# Patient Record
Sex: FEMALE | Race: OTHER
[De-identification: names, ages, dates, MRNs, and addresses within clinical notes are randomized per-mention and may not be internally consistent; named-entity substitution may affect disease eponyms.]

---

## 2020-05-28 ENCOUNTER — HOSPITAL ENCOUNTER (EMERGENCY)
Dept: HOSPITAL 72 - EMR | Age: 30
Discharge: HOME | End: 2020-05-28
Payer: MEDICAID

## 2020-05-28 DIAGNOSIS — R10.30: Primary | ICD-10-CM

## 2020-05-28 DIAGNOSIS — D25.9: ICD-10-CM

## 2020-05-28 DIAGNOSIS — N92.0: ICD-10-CM

## 2020-05-28 LAB
ADD MANUAL DIFF: NO
ALBUMIN SERPL-MCNC: 3.9 G/DL (ref 3.4–5)
ALBUMIN/GLOB SERPL: 1.1 {RATIO} (ref 1–2.7)
ALP SERPL-CCNC: 71 U/L (ref 46–116)
ALT SERPL-CCNC: 19 U/L (ref 12–78)
ANION GAP SERPL CALC-SCNC: 9 MMOL/L (ref 5–15)
APPEARANCE UR: CLEAR
APTT PPP: (no result) S
AST SERPL-CCNC: 17 U/L (ref 15–37)
BASOPHILS NFR BLD AUTO: 1.7 % (ref 0–2)
BILIRUB SERPL-MCNC: 0.2 MG/DL (ref 0.2–1)
BUN SERPL-MCNC: 8 MG/DL (ref 7–18)
CALCIUM SERPL-MCNC: 8.9 MG/DL (ref 8.5–10.1)
CHLORIDE SERPL-SCNC: 106 MMOL/L (ref 98–107)
CO2 SERPL-SCNC: 27 MMOL/L (ref 21–32)
CREAT SERPL-MCNC: 0.7 MG/DL (ref 0.55–1.3)
EOSINOPHIL NFR BLD AUTO: 2 % (ref 0–3)
ERYTHROCYTE [DISTWIDTH] IN BLOOD BY AUTOMATED COUNT: 15.6 % (ref 11.6–14.8)
GLOBULIN SER-MCNC: 3.5 G/DL
GLUCOSE UR STRIP-MCNC: NEGATIVE MG/DL
HCT VFR BLD CALC: 37 % (ref 37–47)
HGB BLD-MCNC: 11.8 G/DL (ref 12–16)
KETONES UR QL STRIP: NEGATIVE
LEUKOCYTE ESTERASE UR QL STRIP: NEGATIVE
LYMPHOCYTES NFR BLD AUTO: 40.1 % (ref 20–45)
MCV RBC AUTO: 86 FL (ref 80–99)
MONOCYTES NFR BLD AUTO: 10.1 % (ref 1–10)
NEUTROPHILS NFR BLD AUTO: 46.2 % (ref 45–75)
NITRITE UR QL STRIP: NEGATIVE
PH UR STRIP: 5 [PH] (ref 4.5–8)
PLATELET # BLD: 248 K/UL (ref 150–450)
POTASSIUM SERPL-SCNC: 3.5 MMOL/L (ref 3.5–5.1)
PROT UR QL STRIP: NEGATIVE
RBC # BLD AUTO: 4.31 M/UL (ref 4.2–5.4)
SODIUM SERPL-SCNC: 142 MMOL/L (ref 136–145)
SP GR UR STRIP: 1.02 (ref 1–1.03)
UROBILINOGEN UR-MCNC: NORMAL MG/DL (ref 0–1)
WBC # BLD AUTO: 5.3 K/UL (ref 4.8–10.8)

## 2020-05-28 PROCEDURE — 99284 EMERGENCY DEPT VISIT MOD MDM: CPT

## 2020-05-28 PROCEDURE — 74176 CT ABD & PELVIS W/O CONTRAST: CPT

## 2020-05-28 PROCEDURE — 85025 COMPLETE CBC W/AUTO DIFF WBC: CPT

## 2020-05-28 PROCEDURE — 80307 DRUG TEST PRSMV CHEM ANLYZR: CPT

## 2020-05-28 PROCEDURE — 80053 COMPREHEN METABOLIC PANEL: CPT

## 2020-05-28 PROCEDURE — 36415 COLL VENOUS BLD VENIPUNCTURE: CPT

## 2020-05-28 PROCEDURE — 81025 URINE PREGNANCY TEST: CPT

## 2020-05-28 PROCEDURE — 81003 URINALYSIS AUTO W/O SCOPE: CPT

## 2020-05-28 NOTE — EMERGENCY ROOM REPORT
History of Present Illness


General


Source:  Patient





Present Illness


HPI


Is a 30-year-old female with no past medical history.  She presents with chief 

plan abdominal pain.  This been a chronic problem for several months but worse 

in the last couple months.  Usually onset around her menstruation.  She said 

her menstruation is very heavy with clots.  She came in because with this 

menstruation she has severe pain where she was sweating and had fever.  No 

urinary complaint.  No dysuria frequency.  Nothing made it better.  Palpation 

made it worse.  Pain is localized to the lower abdominal suprapubic area.  Pain 

is 9 out of 10.


Allergies:  


Coded Allergies:  


     No Known Allergies (Unverified , 7/8/16)





COVID-19 Screening


Contact w/high risk pt:  No


Recent Travel to affected area:  No


Experienced COVID-19 symptoms?:  No


COVID-19 Testing performed PTA:  No





Patient History


Past Medical History:  none, see triage record, old chart reviewed


Past Surgical History:  none


Pertinent Family History:  none


Social History:  Denies: smoking


Pregnant Now:  No


Immunizations:  other


Reviewed Nursing Documentation:  PMH: Agreed; PSxH: Agreed





Review of Systems


Eye:  Denies: eye pain, blurred vision


ENT:  Denies: ear pain, nose congestion, throat swelling


Respiratory:  Denies: cough, shortness of breath


Cardiovascular:  Denies: chest pain, palpitations


Gastrointestinal:  Reports: abdominal pain; Denies: diarrhea, nausea, vomiting


Musculoskeletal:  Denies: back pain, joint pain


Skin:  Denies: rash


Neurological:  Denies: headache, numbness


Endocrine:  Denies: increased thirst, increased urine


Hematologic/Lymphatic:  Denies: easy bruising


All Other Systems:  negative except mentioned in HPI





Physical Exam


Vitals unremarkable


Sp02 EP Interpretation:  reviewed, normal


General Appearance:  well appearing, no apparent distress, alert


Head:  normocephalic, atraumatic


Eyes:  bilateral eye PERRL, bilateral eye EOMI


ENT:  hearing grossly normal, normal pharynx


Neck:  full range of motion, supple, no meningismus


Respiratory:  chest non-tender, lungs clear, normal breath sounds


Cardiovascular #1:  regular rate, rhythm, no murmur


Gastrointestinal:  normal bowel sounds, no mass, no organomegaly, no bruit, non-

distended, tenderness - Abdomen, lower


Musculoskeletal:  back normal, normal range of motion, gait/station normal


Psychiatric:  mood/affect normal





Medical Decision Making


Diagnostic Impression:  


 Primary Impression:  


 Abdominal pain


 Qualified Codes:  R10.30 - Lower abdominal pain, unspecified


 Additional Impressions:  


 Fibroid uterus


 Qualified Codes:  D25.9 - Leiomyoma of uterus, unspecified


 Menorrhagia


 Qualified Codes:  N92.0 - Excessive and frequent menstruation with regular 

cycle


ER Course


Patient presents with abdominal pain.  This is probably secondary to her 

fibroid with menorrhagia.  No evidence of appendicitis, torsion, ectopic


Review.  Will discharge home.  She is better after Toradol.


CT/MRI/US Diagnostic Results


CT/MRI/US Diagnostic Results :  


   Imaging Test Ordered:  CT abdomen pelvis


   Impression


Read by radiologist.  Fibroids.  Negative appendicitis.


Status:  improved


Disposition:  HOME, SELF-CARE


Condition:  Stable


Referrals:  


NOT CHOSEN [facility]/MD,REFERRING (PCP)





Additional Instructions:  


Follow-up with your doctor in 7 days.  Return if symptoms worsen.  You may need 

referral to see a gynecologist for birth control pill.











Tariq Zambrano MD May 28, 2020 03:13

## 2020-05-28 NOTE — DIAGNOSTIC IMAGING REPORT
EXAM:

  CT Abdomen and Pelvis Without Intravenous Contrast

 

CLINICAL HISTORY:

  Abdominal pain for three days

 

TECHNIQUE:

  Axial computed tomography images of the abdomen and pelvis without 

intravenous contrast.  CTDI is 4 mGy and DLP is 195 mGy-cm.  One or more 

of the following dose reduction techniques were used: automated exposure 

control, adjustment of the mA and/or kV according to patient size, use of 

iterative reconstruction technique.

  Coronal and sagittal reformatted images were created and reviewed.

 

COMPARISON:

  No relevant prior studies available.

 

FINDINGS:

  Lung bases:  Unremarkable.  No mass.  No consolidation.

 

 ABDOMEN:

  Liver:  Unremarkable.

  Gallbladder and bile ducts:  Unremarkable.  No calcified stones.  No 

ductal dilation.

  Pancreas:  Unremarkable.  No ductal dilation.

  Spleen:  Unremarkable.  No splenomegaly.

  Adrenals:  Unremarkable.  No mass.

  Kidneys and ureters:  Unremarkable.  No obstructing stones.  No 

hydronephrosis.

  Stomach and bowel:  Prominent colonic stool burden, which could be a 

cause for pain.  No obstruction.  No mucosal thickening.

 

 PELVIS:

  Appendix:  No findings to suggest acute appendicitis.

  Bladder:  Urinary bladder wall thickening. This could be due to 

nondistention, correlate clinically to exclude infectious or inflammatory 

cystitis.  No stones.

  Reproductive:  Fibroid uterus.

 

 ABDOMEN and PELVIS:

  Intraperitoneal space:  Unremarkable.  No free air.  No significant 

fluid collection.

  Bones/joints:  No acute fracture.  No dislocation.

  Soft tissues:  Unremarkable.

  Vasculature:  Unremarkable.  No abdominal aortic aneurysm.

  Lymph nodes:  Unremarkable.  No enlarged lymph nodes.

 

IMPRESSION:     

1.  Prominent colonic stool burden, which could be a cause for pain.

2.  Urinary bladder wall thickening. This could be due to nondistention, 

correlate clinically to exclude infectious or inflammatory cystitis.

3.  Otherwise no acute abnormality definitively identified to account for 

patient presentation.

4.  Fibroid uterus.

## 2020-08-21 ENCOUNTER — HOSPITAL ENCOUNTER (EMERGENCY)
Dept: HOSPITAL 72 - EMR | Age: 30
LOS: 1 days | Discharge: HOME | End: 2020-08-22
Payer: MEDICAID

## 2020-08-21 VITALS — BODY MASS INDEX: 24.75 KG/M2 | WEIGHT: 145 LBS | HEIGHT: 64 IN

## 2020-08-21 VITALS — DIASTOLIC BLOOD PRESSURE: 72 MMHG | SYSTOLIC BLOOD PRESSURE: 105 MMHG

## 2020-08-21 DIAGNOSIS — O23.41: Primary | ICD-10-CM

## 2020-08-21 DIAGNOSIS — N30.00: ICD-10-CM

## 2020-08-21 DIAGNOSIS — B37.9: ICD-10-CM

## 2020-08-21 DIAGNOSIS — Z3A.01: ICD-10-CM

## 2020-08-21 DIAGNOSIS — O98.811: ICD-10-CM

## 2020-08-21 PROCEDURE — 87181 SC STD AGAR DILUTION PER AGT: CPT

## 2020-08-21 PROCEDURE — 87210 SMEAR WET MOUNT SALINE/INK: CPT

## 2020-08-21 PROCEDURE — 99284 EMERGENCY DEPT VISIT MOD MDM: CPT

## 2020-08-21 PROCEDURE — 87086 URINE CULTURE/COLONY COUNT: CPT

## 2020-08-21 PROCEDURE — 81025 URINE PREGNANCY TEST: CPT

## 2020-08-21 PROCEDURE — 76817 TRANSVAGINAL US OBSTETRIC: CPT

## 2020-08-21 PROCEDURE — 81003 URINALYSIS AUTO W/O SCOPE: CPT

## 2020-08-21 PROCEDURE — 76801 OB US < 14 WKS SINGLE FETUS: CPT

## 2020-08-21 NOTE — NUR
ED Nurse Note:

Patient walked into ED c/o pain upon urination. patient rates her pain a 8/10 
pain. reports of having gone to a doctor earlier this week and was told that 
she was pregnant however was not able to see anything from ultrasound. patient 
does rate her pain a 8/10 burning sensation which is made worse upon urination. 
will wait for further orders

## 2020-08-22 VITALS — DIASTOLIC BLOOD PRESSURE: 70 MMHG | SYSTOLIC BLOOD PRESSURE: 110 MMHG

## 2020-08-22 LAB
APPEARANCE UR: (no result)
APTT PPP: YELLOW S
GLUCOSE UR STRIP-MCNC: NEGATIVE MG/DL
KETONES UR QL STRIP: NEGATIVE
LEUKOCYTE ESTERASE UR QL STRIP: (no result)
NITRITE UR QL STRIP: POSITIVE
PH UR STRIP: 5 [PH] (ref 4.5–8)
PROT UR QL STRIP: NEGATIVE
SP GR UR STRIP: 1.02 (ref 1–1.03)
UROBILINOGEN UR-MCNC: NORMAL MG/DL (ref 0–1)

## 2020-08-22 NOTE — EMERGENCY ROOM REPORT
History of Present Illness


General


Chief Complaint:  Female Urogenital Problems


Source:  Patient





Present Illness


HPI


Patient presents with dysuria, lower abdominal cramping and discharge.  Her 

last menstruation was .  She was seen in a clinic a couple of days ago 

and told she is pregnant but that they did not see anything on the ultrasound.  

She started prenatal vites at that time.  She denies fevers or chills.  The 

burning pain when she urinates as gotten worse.  She is been using cranberry 

juice without help.  She denies any vaginal bleeding.  She does not know her 

blood type at this point.  She was trying to get pregnant multiple times.  She 

finally gave up and then became pregnant again recently.  The patient rates her 

pain when she urinates at 8/10.  She has minimal abdominal cramping also.  She 

denies back or flank pain.





No sore throat, chest pain, palpitations, nausea, vomiting, diarrhea, shortness 

of breath, joint pain, rashes, depression, anxiety, visual changes, dizziness, 

headache.


Allergies:  


Coded Allergies:  


     No Known Allergies (Unverified , 16)





COVID-19 Screening


Contact w/high risk pt:  No


Recent Travel to affected area:  No


Experienced COVID-19 symptoms?:  No


COVID-19 Testing performed PTA:  No





Patient History


Past Medical History:  see triage record


Social History:  Reports: alcohol use - Has stopped recently; Denies: smoking, 

drug use


Social History Narrative


Has 9-year-old son at home and is a 


Last Menstrual Period:  2020


Pregnant Now:  Yes


:  4


Para:  1


Reviewed Nursing Documentation:  PMH: Agreed; PSxH: Agreed





Nursing Documentation-PMH


Past Medical History:  No Stated History





Review of Systems


All Other Systems:  negative except mentioned in HPI





Physical Exam





Vital Signs








  Date Time  Temp Pulse Resp B/P (MAP) Pulse Ox O2 Delivery O2 Flow Rate FiO2


 


20 23:16 98.8 81 18 105/72 (83) 99 Room Air  








Sp02 EP Interpretation:  reviewed, normal


General Appearance:  well appearing, no apparent distress, GCS 15


Head:  normocephalic


Eyes:  bilateral eye normal inspection, bilateral eye PERRL


ENT:  other - Wearing a mask


Neck:  supple


Respiratory:  normal inspection


Cardiovascular #1:  regular rate, rhythm


Cardiovascular #2:  2+ radial (R)


Gastrointestinal:  normal inspection, normal bowel sounds, non tender, no mass, 

non-distended


Genitourinary:  no CVA tenderness, ext genitalia/vag normal, os closed, urethra 

normal, other - No adnexal tenderness, minimal cervical inflammation with white 

discharge, uterus hard to size


Musculoskeletal:  back normal, normal range of motion, gait/station normal


Neurologic:  alert, oriented x3, grossly normal


Psychiatric:  mood/affect normal


Skin:  no rash, warm/dry





Medical Decision Making


Diagnostic Impression:  


 Primary Impression:  


 UTI (urinary tract infection)


 Qualified Codes:  N30.00 - Acute cystitis without hematuria


 Additional Impressions:  


 Monilia infection


 Early stage of pregnancy


ER Course


Patient presents with dysuria, abdominal cramping and discharge with history of 

being pregnant.  Differential includes ectopic, urinary tract infection, fibroid

, threatened miscarriage, bacterial vaginosis, yeast amongst others.  Patient 

evaluated with ultrasound and urinalysis and wet mount.  Patient declines 

Tylenol at this time.  Blood typing not indicated based on history and 

physical.  Presumptive Rh positivity.





Urinalysis pyuria.  Wet mount with yeast.





Ultrasound as reported below.





Patient pain improved.  Macrobid given orally.





Discussed findings with patient.  Patient stable for outpatient observation and 

treatment.





Laboratory Tests








Test


  20


23:25


 


Urine Color Yellow  


 


Urine Appearance


  Slightly


cloudy


 


Urine pH 5 (4.5-8.0)  


 


Urine Specific Gravity


  1.020


(1.005-1.035)


 


Urine Protein


  Negative


(NEGATIVE)


 


Urine Glucose (UA)


  Negative


(NEGATIVE)


 


Urine Ketones


  Negative


(NEGATIVE)


 


Urine Blood


  Negative


(NEGATIVE)


 


Urine Nitrite


  Positive


(NEGATIVE)  H


 


Urine Bilirubin


  Negative


(NEGATIVE)


 


Urine Urobilinogen


  Normal MG/DL


(0.0-1.0)


 


Urine Leukocyte Esterase


  2+ (NEGATIVE)


H


 


Urine RBC


  0-2 /HPF (0 -


2)


 


Urine WBC


  30-40 /HPF (0


- 2)  H


 


Urine Squamous Epithelial


Cells Moderate /LPF


(NONE/OCC)  H


 


Urine Bacteria


  Moderate /HPF


(NONE)  H


 


Urine HCG, Qualitative


  Positive


(NEGATIVE)








Microbiology








 Date/Time


Source Procedure


Growth Status


 


 


 20 00:45


Vaginal Wet Prep - Final Complete








CT/MRI/US Diagnostic Results


CT/MRI/US Diagnostic Results :  


   Imaging Test Ordered:  pelvic ultrasounds


   Impression


1.  Intrauterine gestational sac consistent with 5 week 3 day pregnancy without 

yolk sac or fetal pole seen.


2.  This could represent normal early pregnancy or early pregnancy failure.


3.  No acute abnormality definitively identified to explain patient 

presentation.





Last Vital Signs








  Date Time  Temp Pulse Resp B/P (MAP) Pulse Ox O2 Delivery O2 Flow Rate FiO2


 


20 02:45 98.2 73 20 110/70 99 Room Air  








Status:  improved


Disposition:  HOME, SELF-CARE


Condition:  Improved


Scripts


Nitrofurantoin Monohyd/M-Cryst* (MACROBID 100 MG*) 100 Mg Capsule


100 MG ORAL EVERY 12 HOURS, #14 CAP


   Prov: Horace Jovel MD         20 


Clotrimazole (GYNE-LOTRIMIN*) 45 Gm Cream.appl


1 APPLIC VG QHS for 7 Days, #45 GM 0 Refills


   Prov: Horace Jovel MD         20


Referrals:  


NOT CHOSEN [facility]/MD,REFERRING (PCP)











Horace Jovel MD Aug 22, 2020 00:10

## 2020-08-22 NOTE — DIAGNOSTIC IMAGING REPORT
EXAM:

US Pelvis transvaginal, Complete

 

CLINICAL HISTORY:

ABD PAIN

 

TECHNIQUE:

Real-time complete transvaginal pelvic ultrasound with image 

documentation.

 

COMPARISON:

No relevant prior studies available.

 

FINDINGS:

Uterus/cervix: Unremarkable. Normal endometrial stripe thickness. No 

myometrial mass.

Right ovary: Right ovary 2.8 cm Normal blood flow.

Left ovary: Left ovary 2.3 cm Normal blood flow.

Free fluid: No free fluid.

Bladder: Unremarkable as visualized. Wall is normal thickness for degree 

of distention.

Other findings: Intrauterine gestational sac consistent with 5 week 3 day 

pregnancy without yolk sac or fetal pole seen. Mean sac diameter 1.33 cm, 

5 weeks 3 days

 

IMPRESSION: 

1. Intrauterine gestational sac consistent with 5 week 3 day pregnancy 

without yolk sac or fetal pole seen.

2. This could represent normal early pregnancy or early pregnancy failure.

 

3. No acute abnormality definitively identified to explain patient 

presentation

## 2020-08-22 NOTE — DIAGNOSTIC IMAGING REPORT
EXAM:

  US Pelvis Transabdominal, Complete

 

CLINICAL HISTORY:

  ABD PAIN

 

TECHNIQUE:

  Real-time complete transabdominal pelvic ultrasound with image 

documentation.

 

COMPARISON:

  No relevant prior studies available.

 

FINDINGS:

  Uterus/cervix:  Unremarkable.  Normal endometrial stripe thickness.  No 

myometrial mass.

  Right ovary:  Right ovary 2.8 cm  Normal blood flow.

  Left ovary:  Left ovary 2.3 cm  Normal blood flow.

  Free fluid:  No free fluid.

  Bladder:  Unremarkable as visualized.  Wall is normal thickness for 

degree of distention.

  Other findings:  Intrauterine gestational sac consistent with 5 week 3 

day pregnancy without yolk sac or fetal pole seen.  Mean sac diameter 1.

33 cm, 5 weeks 3 days

 

IMPRESSION:     

1.  Intrauterine gestational sac consistent with 5 week 3 day pregnancy 

without yolk sac or fetal pole seen.

2.  This could represent normal early pregnancy or early pregnancy 

failure.

3.  No acute abnormality definitively identified to explain patient 

presentation.